# Patient Record
Sex: MALE | Race: WHITE | NOT HISPANIC OR LATINO | Employment: FULL TIME | URBAN - METROPOLITAN AREA
[De-identification: names, ages, dates, MRNs, and addresses within clinical notes are randomized per-mention and may not be internally consistent; named-entity substitution may affect disease eponyms.]

---

## 2023-04-10 ENCOUNTER — TELEPHONE (OUTPATIENT)
Dept: PRIMARY CARE | Facility: CLINIC | Age: 28
End: 2023-04-10

## 2023-04-10 ENCOUNTER — OFFICE VISIT (OUTPATIENT)
Dept: PRIMARY CARE | Facility: CLINIC | Age: 28
End: 2023-04-10
Payer: COMMERCIAL

## 2023-04-10 VITALS
WEIGHT: 303 LBS | SYSTOLIC BLOOD PRESSURE: 132 MMHG | DIASTOLIC BLOOD PRESSURE: 80 MMHG | OXYGEN SATURATION: 98 % | HEART RATE: 82 BPM | BODY MASS INDEX: 42.86 KG/M2

## 2023-04-10 DIAGNOSIS — G44.209 TENSION HEADACHE: Primary | ICD-10-CM

## 2023-04-10 PROBLEM — E78.2 MIXED HYPERLIPIDEMIA: Status: ACTIVE | Noted: 2023-04-10

## 2023-04-10 PROBLEM — R07.89 ATYPICAL CHEST PAIN: Status: ACTIVE | Noted: 2023-04-10

## 2023-04-10 PROBLEM — R07.89 CHEST WALL PAIN: Status: ACTIVE | Noted: 2023-04-10

## 2023-04-10 PROBLEM — R79.82 ELEVATED C-REACTIVE PROTEIN (CRP): Status: ACTIVE | Noted: 2023-04-10

## 2023-04-10 PROBLEM — R73.02 IMPAIRED GLUCOSE TOLERANCE: Status: ACTIVE | Noted: 2023-04-10

## 2023-04-10 PROBLEM — G47.20 ABNORMAL CIRCADIAN RHYTHM: Status: ACTIVE | Noted: 2023-04-10

## 2023-04-10 PROBLEM — R94.6 ABNORMAL FINDING ON THYROID FUNCTION TEST: Status: ACTIVE | Noted: 2023-04-10

## 2023-04-10 PROBLEM — K21.9 GERD (GASTROESOPHAGEAL REFLUX DISEASE): Status: ACTIVE | Noted: 2023-04-10

## 2023-04-10 PROBLEM — Z86.16 HISTORY OF SEVERE ACUTE RESPIRATORY SYNDROME CORONAVIRUS 2 (SARS-COV-2) DISEASE: Status: ACTIVE | Noted: 2023-04-10

## 2023-04-10 PROBLEM — E66.2 EXTREME OBESITY WITH ALVEOLAR HYPOVENTILATION (MULTI): Status: ACTIVE | Noted: 2023-04-10

## 2023-04-10 PROBLEM — E66.01 MORBID OBESITY (MULTI): Status: ACTIVE | Noted: 2023-04-10

## 2023-04-10 PROBLEM — E04.9 GOITER: Status: ACTIVE | Noted: 2023-04-10

## 2023-04-10 PROBLEM — R63.5 EXCESSIVE WEIGHT GAIN: Status: ACTIVE | Noted: 2023-04-10

## 2023-04-10 PROBLEM — E16.1 HYPERINSULINISM: Status: ACTIVE | Noted: 2023-04-10

## 2023-04-10 PROBLEM — R55 PRE-SYNCOPE: Status: ACTIVE | Noted: 2023-04-10

## 2023-04-10 PROBLEM — T15.90XA EYE FOREIGN BODY: Status: ACTIVE | Noted: 2023-04-10

## 2023-04-10 PROBLEM — E55.9 VITAMIN D DEFICIENCY: Status: ACTIVE | Noted: 2023-04-10

## 2023-04-10 PROBLEM — R79.89 ABNORMAL C-REACTIVE PROTEIN: Status: ACTIVE | Noted: 2023-04-10

## 2023-04-10 PROBLEM — R68.89 CUSHINGOID FACIES: Status: ACTIVE | Noted: 2023-04-10

## 2023-04-10 PROBLEM — R06.81 APNEA: Status: ACTIVE | Noted: 2023-04-10

## 2023-04-10 PROBLEM — E27.49 DECREASED CORTISOL LEVEL (MULTI): Status: ACTIVE | Noted: 2023-04-10

## 2023-04-10 PROBLEM — R25.2 SPASM: Status: ACTIVE | Noted: 2023-04-10

## 2023-04-10 PROBLEM — H61.91 LESION OF EXTERNAL EAR CANAL, RIGHT: Status: ACTIVE | Noted: 2023-04-10

## 2023-04-10 PROBLEM — E66.01 MORBID OBESITY WITH BMI OF 40.0-44.9, ADULT (MULTI): Status: ACTIVE | Noted: 2023-04-10

## 2023-04-10 PROBLEM — L70.0 ACNE VULGARIS: Status: ACTIVE | Noted: 2023-04-10

## 2023-04-10 PROBLEM — R06.83 SNORING: Status: ACTIVE | Noted: 2023-04-10

## 2023-04-10 PROBLEM — H57.9 EYE PROBLEM: Status: ACTIVE | Noted: 2023-04-10

## 2023-04-10 PROBLEM — R00.2 HEART PALPITATIONS: Status: ACTIVE | Noted: 2023-04-10

## 2023-04-10 PROCEDURE — 99213 OFFICE O/P EST LOW 20 MIN: CPT | Performed by: STUDENT IN AN ORGANIZED HEALTH CARE EDUCATION/TRAINING PROGRAM

## 2023-04-10 RX ORDER — EPINEPHRINE 0.22MG
200 AEROSOL WITH ADAPTER (ML) INHALATION
COMMUNITY
Start: 2016-02-11 | End: 2023-04-11 | Stop reason: ALTCHOICE

## 2023-04-10 RX ORDER — GLUCOSAM/CHONDRO/HERB 149/HYAL 750-100 MG
1000 TABLET ORAL
COMMUNITY
Start: 2016-02-11

## 2023-04-10 RX ORDER — CHOLECALCIFEROL (VITAMIN D3) 125 MCG/ML
5000 DROPS ORAL
COMMUNITY
Start: 2017-01-10

## 2023-04-10 RX ORDER — ERGOCALCIFEROL 1.25 MG/1
50000 CAPSULE ORAL WEEKLY
COMMUNITY
Start: 2016-02-11 | End: 2023-04-11 | Stop reason: ALTCHOICE

## 2023-04-10 RX ORDER — BUTALBITAL, ACETAMINOPHEN AND CAFFEINE 50; 325; 40 MG/1; MG/1; MG/1
1 TABLET ORAL EVERY 4 HOURS PRN
Qty: 30 TABLET | Refills: 1 | Status: SHIPPED | OUTPATIENT
Start: 2023-04-10 | End: 2023-04-11 | Stop reason: ALTCHOICE

## 2023-04-10 RX ORDER — TALC
250 POWDER (GRAM) TOPICAL
COMMUNITY
Start: 2016-06-14

## 2023-04-10 RX ORDER — PERPHENAZINE 8 MG
500 TABLET ORAL
COMMUNITY
Start: 2022-02-07 | End: 2023-04-11 | Stop reason: ALTCHOICE

## 2023-04-10 RX ORDER — METFORMIN HYDROCHLORIDE 500 MG/1
TABLET ORAL
COMMUNITY
Start: 2022-02-22 | End: 2023-04-11 | Stop reason: ALTCHOICE

## 2023-04-10 RX ORDER — LANOLIN ALCOHOL/MO/W.PET/CERES
CREAM (GRAM) TOPICAL
COMMUNITY
Start: 2021-05-24

## 2023-04-10 RX ORDER — OMEPRAZOLE 40 MG/1
40 CAPSULE, DELAYED RELEASE ORAL DAILY
COMMUNITY
End: 2023-08-14 | Stop reason: SDUPTHER

## 2023-04-10 RX ORDER — SCOLOPAMINE TRANSDERMAL SYSTEM 1 MG/1
1 PATCH, EXTENDED RELEASE TRANSDERMAL
COMMUNITY
Start: 2022-05-16 | End: 2023-04-11 | Stop reason: ALTCHOICE

## 2023-04-10 RX ORDER — PROPRANOLOL HYDROCHLORIDE 10 MG/1
1 TABLET ORAL 2 TIMES DAILY
COMMUNITY
Start: 2016-10-12 | End: 2023-04-11 | Stop reason: ALTCHOICE

## 2023-04-10 RX ORDER — ACETAMINOPHEN 500 MG
2000 TABLET ORAL
COMMUNITY
Start: 2016-02-11 | End: 2023-04-11 | Stop reason: ALTCHOICE

## 2023-04-10 RX ORDER — VITAMIN K2 40 MCG
TABLET ORAL
COMMUNITY
Start: 2021-05-24

## 2023-04-10 NOTE — PROGRESS NOTES
Subjective   Patient ID: Adonis Bowers is a 28 y.o. male who presents for Headache.    HPI comes in with atypical left-sided headaches for about 1 month mostly on a daily basis.  They are not throbbing.  No significant visual disturbance no neurologic symptoms.  Cannot identify a trigger.  There may be some increased stress in life is his wife is pregnant    Review of Systems  Constitutional: NO F, chills, or sweats  Eyes: no blurred vision or visual disturbance  ENT: no hearing loss, no congestion, no nasal discharge, no hoarseness and no sore throat.   Cardiovascular: no chest pain, no edema, no palps and no syncope.   Respiratory: no cough,no s.o.b. and no wheezing  Gastrointestinal: no abdominal pain, No C/D no N/V, no blood in stools  Genitourinary: no dysuria, no change in urinary frequency, no urinary hesitancy and no feelings of urinary urgency.   Musculoskeletal: no arthralgias,  no back pain and no myalgias.   Integumentary: no new skin lesions and no rashes.   Neurological: no difficulty walking, positive headaches left-sided without associated symptoms no limb weakness, no numbness and no tingling.   Psychiatric: no anxiety, no depression, no anhedonia and no substance use disorders.   Endocrine: no recent weight gain and no recent weight loss.   Hematologic/Lymphatic: no tendency for easy bruising and no swollen glands.  Objective   /80 (BP Location: Right arm, Patient Position: Sitting, BP Cuff Size: Large adult)   Pulse 82   Wt 137 kg (303 lb)   SpO2 98%   BMI 42.86 kg/m²     Physical Exam  gen- a & o x 3, nad, pleasant  heent- eomi, perrla, ear canals patent, TM's non-erythematous, no fluid, frontal and maxillary sinus's nontender    neuro- CNs 2-12 grossly intact, full sensation and strength in all extremities    Assessment/Plan     1.  Left-sided tension headache for about 1 month.  There are no other associated symptoms, no neurological deficits.  Symptoms are not classic with migraine,  and Imitrex did not help.    Prescription for Fioricet to use for severe headaches.  Please call or follow-up if no improvement    Addendum medication adjusted due to side effects on caffeine

## 2023-04-10 NOTE — TELEPHONE ENCOUNTER
Pt called and said that the prescription you gave him Butalbital has caffeine in it and if he has caffeine his heart skips. Pt is wondering if there is another prescription you can prescribe him that doesn't have caffeine in it.

## 2023-04-10 NOTE — PATIENT INSTRUCTIONS
1.  Left-sided tension headache for about 1 month.  There are no other associated symptoms, no neurological deficits.  Symptoms are not classic with migraine, and Imitrex did not help.    Prescription for Fioricet to use for severe headaches.  Please call or follow-up if no improvement

## 2023-04-11 RX ORDER — KETOPROFEN 50 MG/1
50 CAPSULE ORAL 2 TIMES DAILY PRN
Qty: 30 CAPSULE | Refills: 2 | Status: SHIPPED | OUTPATIENT
Start: 2023-04-11 | End: 2023-04-13

## 2023-04-12 DIAGNOSIS — G44.209 TENSION HEADACHE: ICD-10-CM

## 2023-04-13 RX ORDER — DICLOFENAC SODIUM 75 MG/1
75 TABLET, DELAYED RELEASE ORAL 2 TIMES DAILY
Qty: 180 TABLET | Refills: 3 | Status: SHIPPED | OUTPATIENT
Start: 2023-04-13 | End: 2024-04-12

## 2023-08-14 DIAGNOSIS — K21.9 GASTROESOPHAGEAL REFLUX DISEASE, UNSPECIFIED WHETHER ESOPHAGITIS PRESENT: Primary | ICD-10-CM

## 2023-08-14 RX ORDER — SCOLOPAMINE TRANSDERMAL SYSTEM 1 MG/1
1 PATCH, EXTENDED RELEASE TRANSDERMAL
COMMUNITY
Start: 2023-04-17

## 2023-08-14 RX ORDER — OMEPRAZOLE 40 MG/1
40 CAPSULE, DELAYED RELEASE ORAL DAILY
Qty: 90 CAPSULE | Refills: 3 | Status: SHIPPED | OUTPATIENT
Start: 2023-08-14 | End: 2023-08-18

## 2023-08-18 DIAGNOSIS — K21.9 GASTROESOPHAGEAL REFLUX DISEASE, UNSPECIFIED WHETHER ESOPHAGITIS PRESENT: ICD-10-CM

## 2023-08-18 RX ORDER — OMEPRAZOLE 40 MG/1
40 CAPSULE, DELAYED RELEASE ORAL DAILY
Qty: 90 CAPSULE | Refills: 1 | Status: SHIPPED | OUTPATIENT
Start: 2023-08-18

## 2024-10-24 ENCOUNTER — OFFICE VISIT (OUTPATIENT)
Dept: URGENT CARE | Age: 29
End: 2024-10-24
Payer: COMMERCIAL

## 2024-10-24 VITALS
SYSTOLIC BLOOD PRESSURE: 157 MMHG | OXYGEN SATURATION: 98 % | DIASTOLIC BLOOD PRESSURE: 95 MMHG | TEMPERATURE: 97.8 F | RESPIRATION RATE: 16 BRPM

## 2024-10-24 DIAGNOSIS — L08.9 INFECTED SKIN TEAR: Primary | ICD-10-CM

## 2024-10-24 DIAGNOSIS — T14.8XXA INFECTED SKIN TEAR: Primary | ICD-10-CM

## 2024-10-24 RX ORDER — CLOTRIMAZOLE AND BETAMETHASONE DIPROPIONATE 10; .64 MG/G; MG/G
1 CREAM TOPICAL 2 TIMES DAILY
Qty: 15 G | Refills: 0 | Status: SHIPPED | OUTPATIENT
Start: 2024-10-24 | End: 2024-10-31

## 2024-10-24 RX ORDER — MUPIROCIN 20 MG/G
OINTMENT TOPICAL 2 TIMES DAILY
Qty: 22 G | Refills: 0 | Status: SHIPPED | OUTPATIENT
Start: 2024-10-24 | End: 2024-10-31

## 2024-10-24 NOTE — PATIENT INSTRUCTIONS
Use a 2 medicated creams as directed.  Keep the area clean and dry as much as possible.  If symptoms not start improving, follow-up.

## 2024-10-24 NOTE — PROGRESS NOTES
Subjective   Patient ID: Adonis Bowers is a 29 y.o. male. They present today with a chief complaint of Sores on side of penis (Sores on side of penis x 1 week with no discharge, fever, or difficulty with urination.).    Patient disposition: Home    History of Present Illness  HPI  Penile sores for the past 1 week.  No change in urination.  No GI symptoms, abdominal pain, discharge, fever, chills, recent illnesses.  Non-smoker.  Symptoms initially started after having intercourse with wife, had a small skin tear on the foreskin, following day had intercourse again and felt like the skin tear became more irritated.  Foreskin became swollen could not pull up back very well.  Started applying clotrimazole cream for the past 2 days.  Swelling is somewhat decreased.  No discharge.  No urinary symptoms.  Has had skin tears in the past but now feels that it has worsened.      Past Medical History  Allergies as of 10/24/2024    (No Known Allergies)       (Not in a hospital admission)            reports that he has never smoked. He has never used smokeless tobacco.    Review of Systems  As noted in HPI. ROS otherwise negative unless noted.       Objective    Vitals:    10/24/24 1002   BP: (!) 157/95   Resp: 16   Temp: 36.6 °C (97.8 °F)   SpO2: 98%     No LMP for male patient.    Physical Exam  Constitutional: vital signs reviewed. Well developed, well nourished. patient alert and patient without distress.   Psych: Normal mood and affect  Skin: Normal skin color and pigmentation, normal skin turgor, and no rash.  Uncircumcised male phallus.  There is a small abrasion subcentimeter, and about three 1 mm abrasion/ulceration.  No drainage.  No blistering.  Lymphatic: No extremity edema  Cardiovascular: No edema in the extremities. Normal skin color/perfusion.   Pulmonary: Skin without cyanosis. Patient without respiratory distress. Speaking in full sentences.        Procedures    Point of Care Test & Imaging Results from this  visit         Diagnostic study results (if any) were reviewed.    Assessment/Plan   Allergies, medications, history, and pertinent labs/EKGs/Imaging reviewed.    Medical Decision Making  See note    Orders and Diagnoses  There are no diagnoses linked to this encounter.    Medical Admin Record      Follow Up Instructions  No follow-ups on file.        Electronically signed by [facility]

## 2024-11-04 ENCOUNTER — OFFICE VISIT (OUTPATIENT)
Dept: URGENT CARE | Age: 29
End: 2024-11-04
Payer: COMMERCIAL

## 2024-11-04 VITALS
TEMPERATURE: 98.7 F | WEIGHT: 315 LBS | SYSTOLIC BLOOD PRESSURE: 148 MMHG | DIASTOLIC BLOOD PRESSURE: 97 MMHG | HEART RATE: 79 BPM | BODY MASS INDEX: 45.1 KG/M2 | HEIGHT: 70 IN | RESPIRATION RATE: 16 BRPM | OXYGEN SATURATION: 98 %

## 2024-11-04 DIAGNOSIS — L08.9 INFECTED SKIN TEAR: Primary | ICD-10-CM

## 2024-11-04 DIAGNOSIS — T14.8XXA INFECTED SKIN TEAR: Primary | ICD-10-CM

## 2024-11-04 PROCEDURE — 87075 CULTR BACTERIA EXCEPT BLOOD: CPT

## 2024-11-04 PROCEDURE — 87205 SMEAR GRAM STAIN: CPT

## 2024-11-04 PROCEDURE — 87070 CULTURE OTHR SPECIMN AEROBIC: CPT

## 2024-11-04 PROCEDURE — 99213 OFFICE O/P EST LOW 20 MIN: CPT | Performed by: FAMILY MEDICINE

## 2024-11-04 RX ORDER — DOXYCYCLINE 100 MG/1
100 CAPSULE ORAL 2 TIMES DAILY
Qty: 20 CAPSULE | Refills: 0 | Status: SHIPPED | OUTPATIENT
Start: 2024-11-04 | End: 2024-11-14

## 2024-11-04 ASSESSMENT — PAIN SCALES - GENERAL: PAINLEVEL_OUTOF10: 0-NO PAIN

## 2024-11-04 NOTE — PATIENT INSTRUCTIONS
Continue using the topical ointments.  Start using the antibiotic as directed.  A sample will be sent for further testing (culture) to grow the bacteria and test susceptibility. If there is a need to change or add a medication based on this results, you will be notified.  Follow-up in the next several days if symptoms not start improving.

## 2024-11-04 NOTE — PROGRESS NOTES
"Subjective   Patient ID: Adonis Bowers is a 29 y.o. male. They present today with a chief complaint of infected skin tear (On end of penis, was given two ointment medications antibiotic and anti-fungal. Not getting better).    Patient disposition: Home    History of Present Illness  HPI  Patient was seen 2 weeks ago for a skin abrasion on tip of penis, was placed on clotrimazone and mupirocin.  Patient returns today as lesions have not shrunk.  Yellowness and irritation of the lesions have improved with the ointments.  Area continues to feel sensitive.  Has been applying a cotton swab pad to the area to keep the ointment on the area.  No significant discharge.  No new pain.      Past Medical History  Allergies as of 11/04/2024    (No Known Allergies)       (Not in a hospital admission)            reports that he has never smoked. He has never used smokeless tobacco.    Review of Systems  As noted in HPI. ROS otherwise negative unless noted.       Objective    Vitals:    11/04/24 1106   BP: (!) 148/97   BP Location: Left arm   Patient Position: Sitting   BP Cuff Size: Large adult   Pulse: 79   Resp: 16   Temp: 37.1 °C (98.7 °F)   TempSrc: Oral   SpO2: 98%   Weight: 143 kg (315 lb)   Height: 1.778 m (5' 10\")     No LMP for male patient.    Physical Exam  Skin: Tip of phallus with right sided larger ulceration is about the same size.  Base has lightened in color.  No yellowish or drainage anymore.  The left side millimeter 2 lesions have increased in size to about 2 mm each.  No significant current drainage.  No blistering.  Area was cultured.        Procedures    Point of Care Test & Imaging Results from this visit           Diagnostic study results (if any) were reviewed.    Assessment/Plan   Allergies, medications, history, and pertinent labs/EKGs/Imaging reviewed.    Medical Decision Making  See note    Orders and Diagnoses  There are no diagnoses linked to this encounter.    Medical Admin Record      Follow Up " Instructions  No follow-ups on file.      Electronically signed by [facility]

## 2024-11-07 LAB
BACTERIA SPEC CULT: NORMAL
GRAM STN SPEC: NORMAL
GRAM STN SPEC: NORMAL

## 2024-11-11 ENCOUNTER — LAB (OUTPATIENT)
Dept: LAB | Facility: LAB | Age: 29
End: 2024-11-11
Payer: COMMERCIAL

## 2024-11-11 DIAGNOSIS — B00.1 HERPESVIRAL VESICULAR DERMATITIS: Primary | ICD-10-CM

## 2024-11-11 LAB — TREPONEMA PALLIDUM IGG+IGM AB [PRESENCE] IN SERUM OR PLASMA BY IMMUNOASSAY: NONREACTIVE

## 2024-11-11 PROCEDURE — 86780 TREPONEMA PALLIDUM: CPT

## 2024-11-11 PROCEDURE — 36415 COLL VENOUS BLD VENIPUNCTURE: CPT
